# Patient Record
Sex: MALE | Race: WHITE | Employment: OTHER | URBAN - METROPOLITAN AREA
[De-identification: names, ages, dates, MRNs, and addresses within clinical notes are randomized per-mention and may not be internally consistent; named-entity substitution may affect disease eponyms.]

---

## 2017-06-28 ENCOUNTER — HOSPITAL ENCOUNTER (EMERGENCY)
Age: 77
Discharge: HOME | End: 2017-06-28
Attending: EMERGENCY MEDICINE
Payer: MEDICARE

## 2017-06-28 ENCOUNTER — APPOINTMENT (OUTPATIENT)
Dept: CT IMAGING | Age: 77
End: 2017-06-28
Attending: PHYSICIAN ASSISTANT
Payer: MEDICARE

## 2017-06-28 VITALS
WEIGHT: 172.2 LBS | HEART RATE: 54 BPM | BODY MASS INDEX: 24.65 KG/M2 | HEIGHT: 70 IN | DIASTOLIC BLOOD PRESSURE: 67 MMHG | RESPIRATION RATE: 15 BRPM | SYSTOLIC BLOOD PRESSURE: 148 MMHG | TEMPERATURE: 97.8 F | OXYGEN SATURATION: 97 %

## 2017-06-28 DIAGNOSIS — N20.1 LEFT URETERAL CALCULUS: Primary | ICD-10-CM

## 2017-06-28 LAB
ALBUMIN SERPL BCP-MCNC: 3.1 G/DL (ref 3.5–5)
ALBUMIN/GLOB SERPL: 0.8 {RATIO} (ref 1.1–2.2)
ALP SERPL-CCNC: 63 U/L (ref 45–117)
ALT SERPL-CCNC: 30 U/L (ref 12–78)
ANION GAP BLD CALC-SCNC: 5 MMOL/L (ref 5–15)
APPEARANCE UR: CLEAR
AST SERPL W P-5'-P-CCNC: 33 U/L (ref 15–37)
BACTERIA URNS QL MICRO: NEGATIVE /HPF
BASOPHILS # BLD AUTO: 0 K/UL (ref 0–0.1)
BASOPHILS # BLD: 1 % (ref 0–1)
BILIRUB SERPL-MCNC: 0.9 MG/DL (ref 0.2–1)
BILIRUB UR QL: NEGATIVE
BUN SERPL-MCNC: 27 MG/DL (ref 6–20)
BUN/CREAT SERPL: 26 (ref 12–20)
CALCIUM SERPL-MCNC: 9.1 MG/DL (ref 8.5–10.1)
CHLORIDE SERPL-SCNC: 111 MMOL/L (ref 97–108)
CO2 SERPL-SCNC: 27 MMOL/L (ref 21–32)
COLOR UR: ABNORMAL
CREAT SERPL-MCNC: 1.04 MG/DL (ref 0.7–1.3)
EOSINOPHIL # BLD: 0.1 K/UL (ref 0–0.4)
EOSINOPHIL NFR BLD: 3 % (ref 0–7)
EPITH CASTS URNS QL MICRO: ABNORMAL /LPF
ERYTHROCYTE [DISTWIDTH] IN BLOOD BY AUTOMATED COUNT: 12.7 % (ref 11.5–14.5)
GLOBULIN SER CALC-MCNC: 4 G/DL (ref 2–4)
GLUCOSE SERPL-MCNC: 117 MG/DL (ref 65–100)
GLUCOSE UR STRIP.AUTO-MCNC: NEGATIVE MG/DL
HCT VFR BLD AUTO: 41.5 % (ref 36.6–50.3)
HGB BLD-MCNC: 14.2 G/DL (ref 12.1–17)
HGB UR QL STRIP: ABNORMAL
HYALINE CASTS URNS QL MICRO: ABNORMAL /LPF (ref 0–5)
KETONES UR QL STRIP.AUTO: NEGATIVE MG/DL
LEUKOCYTE ESTERASE UR QL STRIP.AUTO: NEGATIVE
LYMPHOCYTES # BLD AUTO: 35 % (ref 12–49)
LYMPHOCYTES # BLD: 1.8 K/UL (ref 0.8–3.5)
MCH RBC QN AUTO: 31.9 PG (ref 26–34)
MCHC RBC AUTO-ENTMCNC: 34.2 G/DL (ref 30–36.5)
MCV RBC AUTO: 93.3 FL (ref 80–99)
MONOCYTES # BLD: 0.6 K/UL (ref 0–1)
MONOCYTES NFR BLD AUTO: 11 % (ref 5–13)
NEUTS SEG # BLD: 2.6 K/UL (ref 1.8–8)
NEUTS SEG NFR BLD AUTO: 50 % (ref 32–75)
NITRITE UR QL STRIP.AUTO: NEGATIVE
PH UR STRIP: 6 [PH] (ref 5–8)
PLATELET # BLD AUTO: 145 K/UL (ref 150–400)
POTASSIUM SERPL-SCNC: 4.1 MMOL/L (ref 3.5–5.1)
PROT SERPL-MCNC: 7.1 G/DL (ref 6.4–8.2)
PROT UR STRIP-MCNC: NEGATIVE MG/DL
RBC # BLD AUTO: 4.45 M/UL (ref 4.1–5.7)
RBC #/AREA URNS HPF: ABNORMAL /HPF (ref 0–5)
SODIUM SERPL-SCNC: 143 MMOL/L (ref 136–145)
SP GR UR REFRACTOMETRY: 1.02 (ref 1–1.03)
UA: UC IF INDICATED,UAUC: ABNORMAL
UROBILINOGEN UR QL STRIP.AUTO: 0.2 EU/DL (ref 0.2–1)
WBC # BLD AUTO: 5.2 K/UL (ref 4.1–11.1)
WBC URNS QL MICRO: ABNORMAL /HPF (ref 0–4)

## 2017-06-28 PROCEDURE — 85025 COMPLETE CBC W/AUTO DIFF WBC: CPT | Performed by: PHYSICIAN ASSISTANT

## 2017-06-28 PROCEDURE — 36415 COLL VENOUS BLD VENIPUNCTURE: CPT | Performed by: PHYSICIAN ASSISTANT

## 2017-06-28 PROCEDURE — 81001 URINALYSIS AUTO W/SCOPE: CPT | Performed by: PHYSICIAN ASSISTANT

## 2017-06-28 PROCEDURE — 96361 HYDRATE IV INFUSION ADD-ON: CPT

## 2017-06-28 PROCEDURE — 99283 EMERGENCY DEPT VISIT LOW MDM: CPT

## 2017-06-28 PROCEDURE — 96374 THER/PROPH/DIAG INJ IV PUSH: CPT

## 2017-06-28 PROCEDURE — 74176 CT ABD & PELVIS W/O CONTRAST: CPT

## 2017-06-28 PROCEDURE — 74011250637 HC RX REV CODE- 250/637: Performed by: PHYSICIAN ASSISTANT

## 2017-06-28 PROCEDURE — 80053 COMPREHEN METABOLIC PANEL: CPT | Performed by: PHYSICIAN ASSISTANT

## 2017-06-28 PROCEDURE — 74011250636 HC RX REV CODE- 250/636: Performed by: PHYSICIAN ASSISTANT

## 2017-06-28 RX ORDER — TAMSULOSIN HYDROCHLORIDE 0.4 MG/1
0.4 CAPSULE ORAL DAILY
Qty: 15 CAP | Refills: 0 | Status: SHIPPED | OUTPATIENT
Start: 2017-06-28 | End: 2017-07-13

## 2017-06-28 RX ORDER — ONDANSETRON 4 MG/1
4 TABLET, ORALLY DISINTEGRATING ORAL
Qty: 10 TAB | Refills: 0 | Status: SHIPPED | OUTPATIENT
Start: 2017-06-28

## 2017-06-28 RX ORDER — KETOROLAC TROMETHAMINE 30 MG/ML
15 INJECTION, SOLUTION INTRAMUSCULAR; INTRAVENOUS
Status: COMPLETED | OUTPATIENT
Start: 2017-06-28 | End: 2017-06-28

## 2017-06-28 RX ORDER — SODIUM CHLORIDE 9 MG/ML
1000 INJECTION, SOLUTION INTRAVENOUS ONCE
Status: COMPLETED | OUTPATIENT
Start: 2017-06-28 | End: 2017-06-28

## 2017-06-28 RX ORDER — TAMSULOSIN HYDROCHLORIDE 0.4 MG/1
0.4 CAPSULE ORAL
Status: COMPLETED | OUTPATIENT
Start: 2017-06-28 | End: 2017-06-28

## 2017-06-28 RX ORDER — KETOROLAC TROMETHAMINE 30 MG/ML
INJECTION, SOLUTION INTRAMUSCULAR; INTRAVENOUS
Status: DISCONTINUED
Start: 2017-06-28 | End: 2017-06-28 | Stop reason: HOSPADM

## 2017-06-28 RX ORDER — HYDROCODONE BITARTRATE AND ACETAMINOPHEN 5; 325 MG/1; MG/1
1 TABLET ORAL
Qty: 8 TAB | Refills: 0 | Status: SHIPPED | OUTPATIENT
Start: 2017-06-28

## 2017-06-28 RX ADMIN — SODIUM CHLORIDE 1000 ML/HR: 900 INJECTION, SOLUTION INTRAVENOUS at 01:35

## 2017-06-28 RX ADMIN — KETOROLAC TROMETHAMINE 15 MG: 30 INJECTION, SOLUTION INTRAMUSCULAR at 01:35

## 2017-06-28 RX ADMIN — TAMSULOSIN HYDROCHLORIDE 0.4 MG: 0.4 CAPSULE ORAL at 02:13

## 2017-06-28 NOTE — DISCHARGE INSTRUCTIONS
We hope that we have addressed all of your medical concerns. The examination and treatment you received in the Emergency Department were for an emergent problem and were not intended as complete care. It is important that you follow up with your healthcare provider(s) for ongoing care. If your symptoms worsen or do not improve as expected, and you are unable to reach your usual health care provider(s), you should return to the Emergency Department. Today's healthcare is undergoing tremendous change, and patient satisfaction surveys are one of the many tools to assess the quality of medical care. You may receive a survey from the [employer] regarding your experience in the Emergency Department. I hope that your experience has been completely positive, particularly the medical care that I provided. As such, please participate in the survey; anything less than excellent does not meet my expectations or intentions. Thank you for allowing us to provide you with medical care today. We realize that you have many choices for your emergency care needs. Please choose us in the future for any continued health care needs. Regards,           April C. SeveroIbrahima, [address] [place] [address].   Office: [phone]            Recent Results (from the past 24 hour(s))   CBC WITH AUTOMATED DIFF    Collection Time: 06/28/17  1:29 AM   Result Value Ref Range    WBC 5.2 4.1 - 11.1 K/uL    RBC 4.45 4.10 - 5.70 M/uL    HGB 14.2 12.1 - 17.0 g/dL    HCT 41.5 36.6 - 50.3 %    MCV 93.3 80.0 - 99.0 FL    MCH 31.9 26.0 - 34.0 PG    MCHC 34.2 30.0 - 36.5 g/dL    RDW 12.7 11.5 - 14.5 %    PLATELET 031 (L) 878 - 400 K/uL    NEUTROPHILS 50 32 - 75 %    LYMPHOCYTES 35 12 - 49 %    MONOCYTES 11 5 - 13 %    EOSINOPHILS 3 0 - 7 %    BASOPHILS 1 0 - 1 %    ABS. NEUTROPHILS 2.6 1.8 - 8.0 K/UL    ABS. LYMPHOCYTES 1.8 0.8 - 3.5 K/UL    ABS. MONOCYTES 0.6 0.0 - 1.0 K/UL    ABS.  EOSINOPHILS 0.1 0.0 - 0.4 K/UL    ABS. BASOPHILS 0.0 0.0 - 0.1 K/UL   METABOLIC PANEL, COMPREHENSIVE    Collection Time: 06/28/17  1:29 AM   Result Value Ref Range    Sodium 143 136 - 145 mmol/L    Potassium 4.1 3.5 - 5.1 mmol/L    Chloride 111 (H) 97 - 108 mmol/L    CO2 27 21 - 32 mmol/L    Anion gap 5 5 - 15 mmol/L    Glucose 117 (H) 65 - 100 mg/dL    BUN 27 (H) 6 - 20 MG/DL    Creatinine 1.04 0.70 - 1.30 MG/DL    BUN/Creatinine ratio 26 (H) 12 - 20      GFR est AA >60 >60 ml/min/1.73m2    GFR est non-AA >60 >60 ml/min/1.73m2    Calcium 9.1 8.5 - 10.1 MG/DL    Bilirubin, total 0.9 0.2 - 1.0 MG/DL    ALT (SGPT) 30 12 - 78 U/L    AST (SGOT) 33 15 - 37 U/L    Alk. phosphatase 63 45 - 117 U/L    Protein, total 7.1 6.4 - 8.2 g/dL    Albumin 3.1 (L) 3.5 - 5.0 g/dL    Globulin 4.0 2.0 - 4.0 g/dL    A-G Ratio 0.8 (L) 1.1 - 2.2     URINALYSIS W/ REFLEX CULTURE    Collection Time: 06/28/17  2:30 AM   Result Value Ref Range    Color YELLOW/STRAW      Appearance CLEAR CLEAR      Specific gravity 1.023 1.003 - 1.030      pH (UA) 6.0 5.0 - 8.0      Protein NEGATIVE  NEG mg/dL    Glucose NEGATIVE  NEG mg/dL    Ketone NEGATIVE  NEG mg/dL    Bilirubin NEGATIVE  NEG      Blood MODERATE (A) NEG      Urobilinogen 0.2 0.2 - 1.0 EU/dL    Nitrites NEGATIVE  NEG      Leukocyte Esterase NEGATIVE  NEG      WBC 0-4 0 - 4 /hpf    RBC 10-20 0 - 5 /hpf    Epithelial cells FEW FEW /lpf    Bacteria NEGATIVE  NEG /hpf    UA:UC IF INDICATED CULTURE NOT INDICATED BY UA RESULT CNI      Hyaline cast 0-2 0 - 5 /lpf       Ct Abd Pelv Wo Cont    Result Date: 6/28/2017  EXAM:  CT abdomen pelvis without contrast INDICATION: Left flank pain. COMPARISON: None. TECHNIQUE: Helical CT of the abdomen  and pelvis  without contrast. Coronal and sagittal reformats are performed. CT dose reduction was achieved through use of a standardized protocol tailored for this examination and automatic exposure control for dose modulation.  Adaptive statistical iterative reconstruction (ASIR) was utilized. FINDINGS: Solid organ evaluation is limited without contrast. The visualized lung bases demonstrate no mass or consolidation. There is a calcified granuloma in the left lower lobe. The heart size is normal. There is no pericardial or pleural effusion. There is a 1 mm calculus in the distal left ureter resulting in moderate left hydroureteronephrosis. There is a nonobstructing left kidney calculus. There is no right hydronephrosis. The urinary bladder is nondistended. Several subcentimeter low-density liver lesions are incompletely characterized but most likely represent cysts. The spleen, pancreas, and adrenal glands are normal.  The gall bladder is surgically absent without intra- or extra-hepatic biliary dilatation. There are no dilated bowel loops. The appendix is normal.  There is sigmoid diverticulosis without focal adjacent inflammation. There are no enlarged lymph nodes. There is no free fluid or free air. The aorta tapers without aneurysm. There is no pelvic mass. The prostate is surgically absent. Posterior spine fusion hardware is seen at L2-S1. There is no aggressive blastic or lytic bony lesion. IMPRESSION: There is a 1 mm calculus in the distal left ureter resulting in moderate left hydroureteronephrosis. There is also a nonobstructing left renal calculus. Additional chronic/incidental findings as above. Kidney Stone: Care Instructions  Your Care Instructions    Kidney stones are formed when salts, minerals, and other substances normally found in the urine clump together. They can be as small as grains of sand or, rarely, as large as golf balls. While the stone is traveling through the ureter, which is the tube that carries urine from the kidney to the bladder, you will probably feel pain. The pain may be mild or very severe. You may also have some blood in your urine. As soon as the stone reaches the bladder, any intense pain should go away.   If a stone is too large to pass on its own, you may need a medical procedure to help you pass the stone. The doctor has checked you carefully, but problems can develop later. If you notice any problems or new symptoms, get medical treatment right away. Follow-up care is a key part of your treatment and safety. Be sure to make and go to all appointments, and call your doctor if you are having problems. It's also a good idea to know your test results and keep a list of the medicines you take. How can you care for yourself at home? · Drink plenty of fluids, enough so that your urine is light yellow or clear like water. If you have kidney, heart, or liver disease and have to limit fluids, talk with your doctor before you increase the amount of fluids you drink. · Take pain medicines exactly as directed. Call your doctor if you think you are having a problem with your medicine. ¨ If the doctor gave you a prescription medicine for pain, take it as prescribed. ¨ If you are not taking a prescription pain medicine, ask your doctor if you can take an over-the-counter medicine. Read and follow all instructions on the label. · Your doctor may ask you to strain your urine so that you can collect your kidney stone when it passes. You can use a kitchen strainer or a tea strainer to catch the stone. Store it in a plastic bag until you see your doctor again. Preventing future kidney stones  Some changes in your diet may help prevent kidney stones. Depending on the cause of your stones, your doctor may recommend that you:  · Drink plenty of fluids, enough so that your urine is light yellow or clear like water. If you have kidney, heart, or liver disease and have to limit fluids, talk with your doctor before you increase the amount of fluids you drink. · Limit coffee, tea, and alcohol. Also avoid grapefruit juice.   · Do not take more than the recommended daily dose of vitamins C and D.  · Avoid antacids such as Gaviscon, Maalox, Mylanta, or Tums.  · Limit the amount of salt (sodium) in your diet. · Eat a balanced diet that is not too high in protein. · Limit foods that are high in a substance called oxalate, which can cause kidney stones. These foods include dark green vegetables, rhubarb, chocolate, wheat bran, nuts, cranberries, and beans. When should you call for help? Call your doctor now or seek immediate medical care if:  · You cannot keep down fluids. · Your pain gets worse. · You have a fever or chills. · You have new or worse pain in your back just below your rib cage (the flank area). · You have new or more blood in your urine. Watch closely for changes in your health, and be sure to contact your doctor if:  · You do not get better as expected. Where can you learn more? Go to http://samantha-jacquelin.info/. Enter C296 in the search box to learn more about \"Kidney Stone: Care Instructions. \"  Current as of: April 3, 2017  Content Version: 11.3  © 2615-3260 [employer]. Care instructions adapted under license by [employer] (which disclaims liability or warranty for this information). If you have questions about a medical condition or this instruction, always ask your healthcare professional. [address] any warranty or liability for your use of this information.

## 2017-06-28 NOTE — ED PROVIDER NOTES
HPI Comments: This is a 64 yo prostate cancer, arrhythmia, IBS, hypercholesterolemia and bowel blockage presenting ambulatory to the ED with complaint of 2-3 day hx of left flank pain, 7/10, constant without any modifying factors with associated increased urinary frequency, urge, hesitancy and urinary retention. Reports having a 10 day stretch without a BM until yesterday. Had large loose bowel movement on yesterday. Took Ibuprofen yesterday with minimal improvement. Denies associated fever, chills, headache, cough, runny nose, sore throat, chest pain, SOB. Pain described as similar to previous kidney stone pain on rt side one year ago. Patient is a 65 y.o. male presenting with flank pain and inability to urinate. The history is provided by the patient. Flank Pain    Pertinent negatives include no chest pain, no fever, no numbness, no headaches, no abdominal pain, no dysuria and no weakness. Urinary Retention    Associated symptoms include arthralgias. Pertinent negatives include no fever, no diarrhea, no vomiting, no constipation, no dysuria, no frequency, no hematuria, no headaches, no chest pain and no testicular pain. Past Medical History:   Diagnosis Date    Arrhythmia     Cancer [facility]) 2002    PROSTATECTOMY    Other ill-defined conditions     IBS    Other ill-defined conditions     HIGH CHOLESTEROL    Unspecified adverse effect of anesthesia     STOMACH BLOCKAGE       Past Surgical History:   Procedure Laterality Date    HX CHOLECYSTECTOMY      HX ORTHOPAEDIC  [id number]    BACK 2 TIMES         Family History:   Problem Relation Age of Onset    Other Mother     Parkinsonism Father        Social History     Social History    Marital status: SINGLE     Spouse name: N/A    Number of children: N/A    Years of education: N/A     Occupational History    Not on file.      Social History Main Topics    Smoking status: Never Smoker    Smokeless tobacco: Not on file    Alcohol use No    Drug use: Not on file    Sexual activity: Not on file     Other Topics Concern    Not on file     Social History Narrative         ALLERGIES: Percocet [oxycodone-acetaminophen]    Review of Systems   Constitutional: Negative. Negative for chills and fever. HENT: Negative for congestion, ear pain, rhinorrhea, sore throat and voice change. Eyes: Negative. Negative for photophobia, pain and itching. Respiratory: Negative for cough, chest tightness and shortness of breath. Cardiovascular: Negative for chest pain and palpitations. Gastrointestinal: Negative for abdominal distention, abdominal pain, constipation, diarrhea and vomiting. Genitourinary: Positive for decreased urine volume, difficulty urinating, flank pain and urgency. Negative for dysuria, frequency, hematuria and testicular pain. Musculoskeletal: Positive for arthralgias. Negative for joint swelling and neck stiffness. Neurological: Negative for weakness, numbness and headaches. Psychiatric/Behavioral: Negative for confusion and decreased concentration. All other systems reviewed and are negative. Vitals:    06/28/17 0107   BP: 195/90   Pulse: 72   Resp: 16   Temp: 97.6 °F (36.4 °C)   SpO2: 100%   Weight: 78.1 kg (172 lb 3.2 oz)   Height: 5' 10\" (1.778 m)            Physical Exam   Constitutional: He is oriented to person, place, and time. He appears well-developed and well-nourished. No distress. Elderly  male seated in NAD   HENT:   Head: Normocephalic and atraumatic. Right Ear: External ear normal.   Left Ear: External ear normal.   Nose: Nose normal.   Mouth/Throat: Oropharynx is clear and moist. No oropharyngeal exudate. Hearing aides to ear canals bilaterally     Eyes: Conjunctivae and EOM are normal. Pupils are equal, round, and reactive to light. Right eye exhibits no discharge. Left eye exhibits no discharge. Neck: Normal range of motion. Neck supple.    Cardiovascular: Normal rate, regular rhythm and normal heart sounds. Pulmonary/Chest: Effort normal and breath sounds normal. He has no wheezes. He has no rales. Abdominal: Soft. Bowel sounds are normal. He exhibits no distension. There is no tenderness. There is no guarding. No CVA tenderness   Musculoskeletal: Normal range of motion. Lymphadenopathy:     He has no cervical adenopathy. Neurological: He is alert and oriented to person, place, and time. Skin: Skin is warm and dry. He is not diaphoretic. Psychiatric: He has a normal mood and affect. His behavior is normal.   Nursing note and vitals reviewed. MDM  Number of Diagnoses or Management Options  Left ureteral calculus:   Diagnosis management comments: 64 yo  male with complaint of left flank pain, dysuria, urgency and hesitancy x 2 days. Similar to prior obstructive uropathy per patient. Plan  CBC, CMP, UA, CT abd/pelv, IVF, analgesia and reassess. Maxime Heredia         Amount and/or Complexity of Data Reviewed  Clinical lab tests: ordered and reviewed  Tests in the radiology section of CPT®: ordered and reviewed  Independent visualization of images, tracings, or specimens: yes      ED Course       Procedures    Progress note    Labs and imaging reviewed. 1mm distal left ureter stone. Maxime Heredia    Patient's results have been reviewed with them. Patient and/or family have verbally conveyed their understanding and agreement of the patient's signs, symptoms, diagnosis, treatment and prognosis and additionally agree to follow up as recommended or return to the Emergency Room should their condition change prior to follow-up. Discharge instructions have also been provided to the patient with some educational information regarding their diagnosis as well a list of reasons why they would want to return to the ER prior to their follow-up appointment should their condition change.  Maxime Heredia    A/P  Left ureter calculus: Please increase fluid intake. Flomax daily until stone passes  Norco 1 tab every 6 hrs if needed for pain. Please exercise caution medication may cause sleepiness and unsteadiness  Zofran 1 tab every 8 hrs if needed for nausea/vomiting  Return for any new or worsening.  April Mcdowell, [address]

## 2017-06-28 NOTE — ED NOTES
MD reviewed discharge instructions with the patient. The patient verbalized understanding. Patient ambulated out of ED by himself. No complaints or concerns noted.

## 2017-06-28 NOTE — ED TRIAGE NOTES
TRIAGE: Patient arrives ambulatory home with flank pain and the urge to urinate and \"nothing happens\". Had a kidney stone 1 year ago.

## 2022-08-04 ENCOUNTER — HOSPITAL ENCOUNTER (EMERGENCY)
Age: 82
Discharge: HOME | End: 2022-08-04
Attending: EMERGENCY MEDICINE
Payer: MEDICARE

## 2022-08-04 VITALS
DIASTOLIC BLOOD PRESSURE: 74 MMHG | OXYGEN SATURATION: 96 % | TEMPERATURE: 98.5 F | SYSTOLIC BLOOD PRESSURE: 122 MMHG | RESPIRATION RATE: 18 BRPM | HEART RATE: 89 BPM

## 2022-08-04 DIAGNOSIS — F03.91 DEMENTIA WITH BEHAVIORAL DISTURBANCE, UNSPECIFIED DEMENTIA TYPE: Primary | ICD-10-CM

## 2022-08-04 LAB
APPEARANCE UR: CLEAR
BACTERIA URNS QL MICRO: ABNORMAL /HPF
BILIRUB UR QL: NEGATIVE
COLOR UR: ABNORMAL
EPITH CASTS URNS QL MICRO: ABNORMAL /LPF
GLUCOSE UR STRIP.AUTO-MCNC: NEGATIVE MG/DL
HGB UR QL STRIP: NEGATIVE
KETONES UR QL STRIP.AUTO: 15 MG/DL
LEUKOCYTE ESTERASE UR QL STRIP.AUTO: ABNORMAL
NITRITE UR QL STRIP.AUTO: NEGATIVE
PH UR STRIP: 5.5 [PH] (ref 5–8)
PROT UR STRIP-MCNC: ABNORMAL MG/DL
RBC #/AREA URNS HPF: ABNORMAL /HPF (ref 0–5)
SP GR UR REFRACTOMETRY: 1.02 (ref 1–1.03)
UR CULT HOLD, URHOLD: NORMAL
UROBILINOGEN UR QL STRIP.AUTO: 0.2 EU/DL (ref 0.2–1)
WBC URNS QL MICRO: ABNORMAL /HPF (ref 0–4)

## 2022-08-04 PROCEDURE — 74011250637 HC RX REV CODE- 250/637: Performed by: EMERGENCY MEDICINE

## 2022-08-04 PROCEDURE — 81001 URINALYSIS AUTO W/SCOPE: CPT

## 2022-08-04 PROCEDURE — 99283 EMERGENCY DEPT VISIT LOW MDM: CPT

## 2022-08-04 RX ORDER — LORAZEPAM 0.5 MG/1
1 TABLET ORAL
Status: COMPLETED | OUTPATIENT
Start: 2022-08-04 | End: 2022-08-04

## 2022-08-04 RX ADMIN — LORAZEPAM 1 MG: 0.5 TABLET ORAL at 13:38

## 2022-08-04 NOTE — BSMART NOTE
Comprehensive Assessment Form Part 1      Section I - Disposition    Axis I - Dementia/Parkinson's   Axis II - deferred  Axis III -   Past Medical History:   Diagnosis Date    Arrhythmia     Cancer ([facility] [address] 2002    PROSTATECTOMY    Other ill-defined conditions(799.89)     IBS    Other ill-defined conditions(799.89)     HIGH CHOLESTEROL    Unspecified adverse effect of anesthesia     STOMACH BLOCKAGE     The Medical Doctor to Psychiatrist conference was not completed. The Medical Doctor is in agreement with Psychiatrist disposition because of (reason) pt not meeting admission criteria at this time. The plan is discharge back to memory care facility. The on-call Psychiatrist consulted was Dr. Vandana Jefferson. The admitting Psychiatrist will be Dr. Vandana Jefferson. The admitting Diagnosis is NA. The Payor source is Medicare. Based on the [place] Suicide Severity Risk Level  Scale there is unknown/LOW risk for suicide-cannot assess bc of dementia-pt denied SI. Based on this assessment the overall risk of suicide is LOW. The plan will be medically clear, refer to Senior Services and discharge back to memory care facility. Section II - Integrated Summary  Summary:  Per triage, \"Pt is in dementia unit at [facility]. Pt insisting on leaving, refused to come back inside, and then became physically aggressive, anxious and swung at several different staff. Cooperative, ambulatory per EMS; hx of decubitus ulcer, incontinence. \"    Pt is an 79year old female presenting to ER with dementia from [facility] at [facility]. Pt acting out aggressively today. Pt seen face to face at bedside but it was difficult to hear pt as he spoke soft and this writer did not want to get close to listen better as pt documented as becoming aggressive. Pt shared he was not having SI/HI. He demanded to know why he was here and stated, \"I mean it. \" Pt shared with this writer that pad I was writing on was a test for him and he was going to get to the bottom of it. Pt stated he was frustrated with writer \"because I cannot tell (him) the truth\" about this \"test.\" Pt unaware of surroundings and circumstances and was unable to complete assessment to its fullest with accurate information. Pt at this time has had hx of dementia for quite sometime and it has progressed. No mental health issues reported to this writer from attending physician that he received from care facility. Symptoms seem concurrent with diagnosed dementia regarding decline of memory and communication as opposed to mental health. Plan is to refer back to facility M.D. for medications to stabilize pt's behavior and suggest Senior Services Consult for possible resource suggestions. The patienthas not demonstrated mental capacity to provide informed consent. The information is given by the past medical records. The Chief Complaint is aggressive behavior since yesterday. The Precipitant Factors are unknown. Previous Hospitalizations: unknown  The patient has not previously been in restraints. Current Psychiatrist and/or  is none. Lethality Assessment:    The potential for suicide not noted. The potential for homicide is not noted but pt has dementia. The patient has not been a perpetrator of sexual or physical abuse. There are not pending charges. The patient is not felt to be at risk for self harm or harm to others. The attending nurse was advised to remove potentially harmful or dangerous items from the patient's room . Section III - Psychosocial  The patient's overall mood and attitude is irritable. Feelings of helplessness and hopelessness are not observed. Generalized anxiety is observed by current presentation (restless). Panic is not observed. Phobias are not observed. Obsessive compulsive tendencies are not observed. Section IV - Mental Status Exam  The patient's appearance shows no evidence of impairment.   The patient's behavior is agitated and is restless. The patient is only aware of  person. The patient's speech is soft. The patient's mood is irritable. The range of affect shows no evidence of impairment. The patient's thought content demonstrates paranoia. The thought process perseveration. The patient's perception demonstrated no changes at this time. . The patient's memory is impaired. The patient's appetite shows no evidence of impairment. The patient's sleep shows no evidence of impairment. The patient shows no insight. The patient's judgement is cognitively impaired. Section V - Substance Abuse  The patient is not using substances. Section VI - Living Arrangements  The patient unknown. The patient lives [address] [facility]. The patient has unknown. The patient does plan to return home upon discharge. The patient does not have legal issues pending. The patient's source of income comes from social security. Sabianism and cultural practices have not been voiced at this time. The patient's greatest support comes from unknown and this person will not be involved with the treatment. The patient has not been in an event described as horrible or outside the realm of ordinary life experience either currently or in the past.  The patient has not been a victim of sexual/physical abuse. Section VII - Other Areas of Clinical Concern  The highest grade achieved is unknown with the overall quality of school experience being described as unknown. The patient is currently disabled and speaks [place] as a primary language. The patient has the following communication impairment;  dementia affecting communication. The patient's preference for learning can be described as: learns best by oral information. The patient's hearing is hard of hearing.   The patient's vision is normal.      Zee Guevara MS, Resident in [place]

## 2022-08-04 NOTE — BSMART NOTE
BSMART assessment completed, and suicide risk level noted to be LOW. Primary Nurse EMMA and Charge Nurse Yamilet Hein and Physician Dr. Moreno Gastelum notified. Concerns not observed. Security/Off- has not been notified.
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
## 2022-08-04 NOTE — FORENSIC NURSE
FNE responded to code Clarence. Pt assisted back to bed, will be medicated. Per Jose Roberto Alexis RN.
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
## 2022-08-04 NOTE — ED NOTES
Pt given Ativan to help calm down, obtain urine sample. 1500: TRANSFER - OUT REPORT:    Verbal report given to Wen RN(name) on Levorlillian Fraser  being transferred to [facility]) for routine progression of care       Report consisted of patients Situation, Background, Assessment and   Recommendations(SBAR). Information from the following report(s) SBAR, Kardex, MAR, and Recent Results was reviewed with the receiving nurse. Lines:       Opportunity for questions and clarification was provided.       Patient transported with:     [facility]

## 2022-08-04 NOTE — PROGRESS NOTES
VM received from Danitza, Nurse Manager and [facility] [phone]. Calls returned spoke w/ Wen informed communication w/ [facility]  and will attempt to obtain urine sample. No additional concerns expressed at this time. Attempted to reach [facility] switch board call however unable to have call connected.

## 2022-08-04 NOTE — PROGRESS NOTES
Patient is discharge. Spoke w/ Lalita Valenzuela RN. Electronic referral placed to [facility] alternative calls placed to [facility]. Call received from [facility] next available in 15 minutes . Nursing Updated.

## 2022-08-04 NOTE — ED NOTES
Pt standing up out of stretcher and attempting to walk around. Pt stumbling, unsteady gait. Staff attempted to assist pt back on stretcher.  Pt hitting
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
## 2022-11-07 ENCOUNTER — HOSPITAL ENCOUNTER (EMERGENCY)
Age: 82
Discharge: HOME | End: 2022-11-07
Attending: STUDENT IN AN ORGANIZED HEALTH CARE EDUCATION/TRAINING PROGRAM
Payer: MEDICARE

## 2022-11-07 ENCOUNTER — APPOINTMENT (OUTPATIENT)
Dept: CT IMAGING | Age: 82
End: 2022-11-07
Attending: STUDENT IN AN ORGANIZED HEALTH CARE EDUCATION/TRAINING PROGRAM
Payer: MEDICARE

## 2022-11-07 VITALS
OXYGEN SATURATION: 94 % | SYSTOLIC BLOOD PRESSURE: 153 MMHG | WEIGHT: 163.58 LBS | RESPIRATION RATE: 20 BRPM | HEART RATE: 62 BPM | TEMPERATURE: 97.5 F | BODY MASS INDEX: 23.47 KG/M2 | DIASTOLIC BLOOD PRESSURE: 63 MMHG
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
## 2022-11-07 PROCEDURE — 72125 CT NECK SPINE W/O DYE: CPT

## 2022-11-07 PROCEDURE — 70450 CT HEAD/BRAIN W/O DYE: CPT

## 2022-11-07 PROCEDURE — 99284 EMERGENCY DEPT VISIT MOD MDM: CPT

## 2022-11-08 NOTE — ED NOTES
Ken Oliveros RN reviewed discharge instructions with the Nurse at facility. The Nurse at facility verbalized understanding.

## 2022-11-08 NOTE — ED PROVIDER NOTES
13-year-old male with history of dementia, Parkinson's, A. fib presents to the ED with chief complaint of ground-level fall. Per EMS patient was found at 8 PM on the floor. He has no complaints at this time, denies any headache, neck pain, chest pain, abdominal pain, urinary symptoms, bowel symptoms. Per EMS, patient at his cognitive and physical baseline. No wounds noted. The history is provided by the patient and the EMS personnel.    Fall       Past Medical History:   Diagnosis Date    Arrhythmia     Cancer ([facility] [address] 2002    PROSTATECTOMY    Other ill-defined conditions(799.89)     IBS    Other ill-defined conditions(799.89)     HIGH CHOLESTEROL    Unspecified adverse effect of anesthesia     STOMACH BLOCKAGE       Past Surgical History:   Procedure Laterality Date    HX CHOLECYSTECTOMY      HX ORTHOPAEDIC  [id number]    BACK 2 TIMES         Family History:   Problem Relation Age of Onset    Other Mother     Parkinsonism Father        Social History     Socioeconomic History    Marital status: SINGLE     Spouse name: Not on file    Number of children: Not on file    Years of education: Not on file    Highest education level: Not on file   Occupational History    Not on file   Tobacco Use    Smoking status: Never    Smokeless tobacco: Not on file   Substance and Sexual Activity    Alcohol use: No    Drug use: Not on file    Sexual activity: Not on file   Other Topics Concern    Not on file   Social History Narrative    Not on file     Social Determinants of Health     Financial Resource Strain: Not on file   Food Insecurity: Not on file   Transportation Needs: Not on file   Physical Activity: Not on file   Stress: Not on file   Social Connections: Not on file   Intimate Partner Violence: Not on file   Housing Stability: Not on file         ALLERGIES: Percocet [oxycodone-acetaminophen]    Review of Systems   Unable to perform ROS: Dementia     Vitals:    11/07/22 2143   BP: (!) 153/63   Pulse: 62   Resp: 20 Temp: 97.5 °F (36.4 °C)   SpO2: 94%   Weight: 74.2 kg (163 lb 9.3 oz)            Physical Exam  Constitutional:       General: He is not in acute distress. Appearance: He is well-developed. HENT:      Head: Normocephalic and atraumatic. Eyes:      Conjunctiva/sclera: Conjunctivae normal.      Pupils: Pupils are equal, round, and reactive to light. Neck:      Trachea: No tracheal deviation. Cardiovascular:      Rate and Rhythm: Normal rate and regular rhythm. Heart sounds: No murmur heard. No friction rub. No gallop. Pulmonary:      Effort: No respiratory distress. Breath sounds: Normal breath sounds. Abdominal:      General: Bowel sounds are normal. There is no distension. Palpations: Abdomen is soft. Tenderness: There is no abdominal tenderness. Musculoskeletal:         General: No deformity. Cervical back: Neck supple. Comments: No C, T, or L-spine tenderness or step-offs. Skin:     General: Skin is warm and dry. Neurological:      Mental Status: He is alert. Mental status is at baseline. Sensory: No sensory deficit. Motor: No weakness. MDM  Number of Diagnoses or Management Options  Fall from ground level  Diagnosis management comments: 48-year-old male presenting to the ED for evaluation after ground-level fall. No complaints at this time. Given history of dementia will obtain head CT and C-spine CT to rule out acute injury. If negative will plan on discharge. Patient reportedly at baseline, will defer labs at this time. Amount and/or Complexity of Data Reviewed  Tests in the radiology section of CPT®: ordered and reviewed           Procedures    DISCHARGE NOTE:  The patient has been re-evaluated and feeling much better and are stable for discharge. All available radiology and laboratory results have been reviewed with patient and/or available family.   Patient and/or family verbally conveyed their understanding and agreement of the patient's signs, symptoms, diagnosis, treatment and prognosis and additionally agree to follow-up as recommended in the discharge instructions or to return to the Emergency Department should their condition change or worsen prior to their follow-up appointment. All questions have been answered and patient and/or available family express understanding. LABORATORY RESULTS:  No results found for this or any previous visit (from the past 24 hour(s)). IMAGING RESULTS:  CT HEAD WO CONT    Result Date: 11/7/2022  No acute intracranial process identified. No evidence for cervical spine trauma. CT SPINE CERV WO CONT    Result Date: 11/7/2022  No acute intracranial process identified. No evidence for cervical spine trauma. MEDICATIONS GIVEN:  Medications - No data to display    IMPRESSION:  1.  Fall from ground level        PLAN:  Follow-up Information       Follow up With Specialties Details Why Contact Info    Ree Masterson MD Family Medicine In 1 week  [address] [place] [address],[address]  [phone]            Discharge Medication List as of 11/7/2022 11:08 PM          Signed By: Alma Shields MD     November 8, 2022

## 2022-11-08 NOTE — ED TRIAGE NOTES
Emergency Room Nursing Note        Patient Name: Kwame Moura      : 1940             MRN: [id number]      Chief Complaint:  Fall      Admit Diagnosis: No admission diagnoses are documented for this encounter. Admitting Provider: No admitting provider for patient encounter. Surgery: * No surgery found *           Patient arrived via EMS from [facility] with complaints of a GLF. Patient was found at  on the floor. Per EMS pt has a Hx of Dementia, Parkinson's, Confusion & A-fib.          Lines:        Signed by: Odilia Dong RN, DONALDO, BSN, VIA [facility]                                              2022 at 9:48 PM

## 2022-11-18 ENCOUNTER — APPOINTMENT (OUTPATIENT)
Dept: CT IMAGING | Age: 82
End: 2022-11-18
Attending: EMERGENCY MEDICINE
Payer: MEDICARE

## 2022-11-18 ENCOUNTER — APPOINTMENT (OUTPATIENT)
Dept: GENERAL RADIOLOGY | Age: 82
End: 2022-11-18
Attending: EMERGENCY MEDICINE
Payer: MEDICARE

## 2022-11-18 ENCOUNTER — HOSPITAL ENCOUNTER (EMERGENCY)
Age: 82
Discharge: HOME | End: 2022-11-19
Attending: EMERGENCY MEDICINE
Payer: MEDICARE

## 2022-11-18 VITALS
RESPIRATION RATE: 16 BRPM | WEIGHT: 166.01 LBS | DIASTOLIC BLOOD PRESSURE: 67 MMHG | BODY MASS INDEX: 23.82 KG/M2 | SYSTOLIC BLOOD PRESSURE: 116 MMHG | TEMPERATURE: 98.1 F | HEART RATE: 67 BPM | OXYGEN SATURATION: 94 %
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
## 2022-11-18 PROCEDURE — 99284 EMERGENCY DEPT VISIT MOD MDM: CPT

## 2022-11-18 PROCEDURE — 72170 X-RAY EXAM OF PELVIS: CPT

## 2022-11-18 PROCEDURE — 70450 CT HEAD/BRAIN W/O DYE: CPT

## 2022-11-18 PROCEDURE — 72125 CT NECK SPINE W/O DYE: CPT

## 2022-11-18 PROCEDURE — 71045 X-RAY EXAM CHEST 1 VIEW: CPT

## 2022-11-19 NOTE — ED TRIAGE NOTES
Patient arrived for [facility] memory care. Per EMS patient has a GLF, unwitnessed, was found on the bathroom floor. Patient is quiet, confused at baseline. Reports no pain. No blood thinner medications.

## 2022-11-19 NOTE — ED PROVIDER NOTES
The history is provided by the patient. The history is limited by the condition of the patient (cognitive deficit). Fall  The accident occurred 3 to 5 hours ago. The fall occurred while standing. He fell from a height of ground level. The patient is experiencing no pain. The risk factors include dementia, being elderly and recurrent falls.        Past Medical History:   Diagnosis Date    Arrhythmia     Cancer ([facility] [address] 01/01/2002    PROSTATECTOMY    Cognitive communication deficit     Muscle weakness     Other ill-defined conditions(799.89)     IBS    Other ill-defined conditions(799.89)     HIGH CHOLESTEROL    Unspecified adverse effect of anesthesia     STOMACH BLOCKAGE       Past Surgical History:   Procedure Laterality Date    HX CHOLECYSTECTOMY      HX ORTHOPAEDIC  [id number]    BACK 2 TIMES         Family History:   Problem Relation Age of Onset    Other Mother     Parkinsonism Father        Social History     Socioeconomic History    Marital status: SINGLE     Spouse name: Not on file    Number of children: Not on file    Years of education: Not on file    Highest education level: Not on file   Occupational History    Not on file   Tobacco Use    Smoking status: Never    Smokeless tobacco: Not on file   Substance and Sexual Activity    Alcohol use: No    Drug use: Not on file    Sexual activity: Not on file   Other Topics Concern    Not on file   Social History Narrative    Not on file     Social Determinants of Health     Financial Resource Strain: Not on file   Food Insecurity: Not on file   Transportation Needs: Not on file   Physical Activity: Not on file   Stress: Not on file   Social Connections: Not on file   Intimate Partner Violence: Not on file   Housing Stability: Not on file         ALLERGIES: Percocet [oxycodone-acetaminophen]    Review of Systems   Unable to perform ROS: Dementia     Vitals:    11/18/22 2201 11/18/22 2205 11/18/22 2210   BP:  116/67    Pulse: 67     Resp: 16     Temp: 98.1 °F (36.7 °C)     SpO2: 93% 94% 94%   Weight: 75.3 kg (166 lb 0.1 oz)              Physical Exam  Vitals and nursing note reviewed. Constitutional:       General: He is not in acute distress. Appearance: He is well-developed. HENT:      Head: Normocephalic and atraumatic. Eyes:      Conjunctiva/sclera: Conjunctivae normal.      Pupils: Pupils are equal, round, and reactive to light. Cardiovascular:      Rate and Rhythm: Normal rate and regular rhythm. Pulmonary:      Effort: Pulmonary effort is normal.      Breath sounds: Normal breath sounds. Abdominal:      General: There is no distension. Palpations: Abdomen is soft. Tenderness: There is no abdominal tenderness. Musculoskeletal:         General: No swelling, tenderness or deformity. Normal range of motion. Cervical back: Normal and normal range of motion. Thoracic back: Normal.      Lumbar back: Normal.   Skin:     General: Skin is warm and dry. Capillary Refill: Capillary refill takes less than 2 seconds. Neurological:      General: No focal deficit present. Mental Status: He is alert. Mental status is at baseline. Psychiatric:         Mood and Affect: Mood normal.         Behavior: Behavior normal.        MDM         Procedures      The patient presented after an unwitnessed fall. The patient is now resting comfortably and feels better, is alert and in no distress. The patient has a normal mental status and is neurologically intact. The history, exam, diagnostic testing (if any), and current condition do not demonstrate signs of clinically significant intra-cranial, intra-thoracic, intra-abdominal, or musculoskeletal trauma. The vital signs have been stable. The patient's condition is stable and appropriate for discharge. The patient will pursue further outpatient evaluation with the primary care physician or other designated or consulting physician as indicated in the discharge instructions.